# Patient Record
Sex: FEMALE | Race: WHITE | NOT HISPANIC OR LATINO | URBAN - METROPOLITAN AREA
[De-identification: names, ages, dates, MRNs, and addresses within clinical notes are randomized per-mention and may not be internally consistent; named-entity substitution may affect disease eponyms.]

---

## 2020-03-22 ENCOUNTER — EMERGENCY (EMERGENCY)
Facility: HOSPITAL | Age: 30
LOS: 1 days | End: 2020-03-22
Admitting: EMERGENCY MEDICINE
Payer: MEDICAID

## 2020-03-22 PROCEDURE — 99053 MED SERV 10PM-8AM 24 HR FAC: CPT

## 2020-03-22 PROCEDURE — 99284 EMERGENCY DEPT VISIT MOD MDM: CPT

## 2020-08-03 PROBLEM — Z00.00 ENCOUNTER FOR PREVENTIVE HEALTH EXAMINATION: Status: ACTIVE | Noted: 2020-08-03

## 2020-09-11 ENCOUNTER — APPOINTMENT (OUTPATIENT)
Dept: COLORECTAL SURGERY | Facility: CLINIC | Age: 30
End: 2020-09-11
Payer: MEDICAID

## 2020-09-11 VITALS
WEIGHT: 103 LBS | HEIGHT: 62 IN | HEART RATE: 75 BPM | DIASTOLIC BLOOD PRESSURE: 80 MMHG | TEMPERATURE: 97.8 F | SYSTOLIC BLOOD PRESSURE: 118 MMHG | BODY MASS INDEX: 18.95 KG/M2

## 2020-09-11 DIAGNOSIS — Z82.49 FAMILY HISTORY OF ISCHEMIC HEART DISEASE AND OTHER DISEASES OF THE CIRCULATORY SYSTEM: ICD-10-CM

## 2020-09-11 DIAGNOSIS — Z72.89 OTHER PROBLEMS RELATED TO LIFESTYLE: ICD-10-CM

## 2020-09-11 DIAGNOSIS — Z72.3 LACK OF PHYSICAL EXERCISE: ICD-10-CM

## 2020-09-11 PROCEDURE — 99202 OFFICE O/P NEW SF 15 MIN: CPT | Mod: 25

## 2020-09-11 PROCEDURE — 46600 DIAGNOSTIC ANOSCOPY SPX: CPT

## 2020-09-11 RX ORDER — SERTRALINE HYDROCHLORIDE 50 MG/1
50 TABLET, FILM COATED ORAL
Refills: 0 | Status: ACTIVE | COMMUNITY

## 2020-09-11 NOTE — ASSESSMENT
[FreeTextEntry1] : I reviewed the patient had her symptoms and findings may be attributable to a prolapsed hemorrhoid versus possible mucosal prolapse. I therefore recommended she take a picture of the findings after bowel movements a week and best evaluate. If there is residual questions we will proceed with an MRI. I have discussed with her that of hemorrhoidal protrusion is identified she would be an excellent for ligation. The risks, benefits alternatives and the treatment plan were outlined and reviewed.

## 2020-09-11 NOTE — PHYSICAL EXAM
[Normal] : was normal [None] : there was no rectal mass  [Excoriation] : no perianal excoriation [Skin Tags] : there were no residual hemorrhoidal skin tags seen [FreeTextEntry1] : A lighted anoscope was passed into the anal canal and the entire anal mucosal surface was inspected..  The findings revealed moderate internal hemorrhoids. No masses or lesions were identified.\par \par

## 2020-09-11 NOTE — HISTORY OF PRESENT ILLNESS
[FreeTextEntry1] : 29 yo F presents for evaluation of anal growth, referred by PCP Dr. Vlad Gomes\par \par Reports she noticed a small anal bump approximately 2 years ago. Denies pain, tenderness or BPR at this time. Area has increased in size over the last 4-6 months and now uncomfortable after BMs and recently noted itching and scant BRB noted on TP w/ some BMs\par + Prolapsing tissue that requires manual reduction with 80% of BMs\par Admits to occasional constipation and diarrhea which she attributes to coffee or taking Zoloft\par Occasional generalized abdominal pain and nausea. Denies vomiting. Has never been evaluated by GI\par Has never tried sitz baths or OTC hemorrhoidal medication\par \par BH: daily to every 2 days w/ straining\par Reports intake of dietary fiber and admits could improve water intake\par Denies use of stool softeners or fiber supplements\par \par Never had colonoscopy\par Denies [facility] colorectal CA\par Denies  ASA/NSAIDs in last 7 days\par

## 2020-11-09 ENCOUNTER — APPOINTMENT (OUTPATIENT)
Dept: COLORECTAL SURGERY | Facility: CLINIC | Age: 30
End: 2020-11-09

## 2021-02-12 ENCOUNTER — APPOINTMENT (OUTPATIENT)
Dept: COLORECTAL SURGERY | Facility: CLINIC | Age: 31
End: 2021-02-12

## 2021-08-02 ENCOUNTER — APPOINTMENT (OUTPATIENT)
Dept: COLORECTAL SURGERY | Facility: CLINIC | Age: 31
End: 2021-08-02
Payer: MEDICAID

## 2021-08-02 VITALS
WEIGHT: 104 LBS | HEART RATE: 77 BPM | SYSTOLIC BLOOD PRESSURE: 105 MMHG | DIASTOLIC BLOOD PRESSURE: 67 MMHG | TEMPERATURE: 98.5 F | BODY MASS INDEX: 19.14 KG/M2 | HEIGHT: 62 IN

## 2021-08-02 PROCEDURE — 99072 ADDL SUPL MATRL&STAF TM PHE: CPT

## 2021-08-02 PROCEDURE — 99213 OFFICE O/P EST LOW 20 MIN: CPT | Mod: 25

## 2021-08-02 PROCEDURE — 46221 LIGATION OF HEMORRHOID(S): CPT

## 2021-08-02 RX ORDER — DEXTROAMPHETAMINE SACCHARATE, AMPHETAMINE ASPARTATE, DEXTROAMPHETAMINE SULFATE AND AMPHETAMINE SULFATE 2.5; 2.5; 2.5; 2.5 MG/1; MG/1; MG/1; MG/1
10 TABLET ORAL
Qty: 30 | Refills: 0 | Status: ACTIVE | COMMUNITY
Start: 2021-07-30

## 2021-08-02 NOTE — HISTORY OF PRESENT ILLNESS
[FreeTextEntry1] : 32 yo F presents for f/u hemorrhoids\par \par Last seen 9/11/2020, external hemorrhoid and moderate internal hemorrhoids noted on exam\par Advised findings may be related to prolapsed hemorrhoid vs rectal prolapse\par Pt presents for RBL\par \par c/o prolapsing tissue w/ every BM, which often reduces on its own\par Occasional scant BRB noted on TP\par Denies pain, itching, burning\par \par BH: every other day, admits to some constipation associated w/ straining\par Has been increasing dietary fiber, admits could improve water intake (drinks 4 cups water)\par Denies fiber supplement or stool softener\par Denies ASA/NSAID use in the last 7 days

## 2021-08-02 NOTE — PHYSICAL EXAM
[Excoriation] : no perianal excoriation [Skin Tags] : there were no residual hemorrhoidal skin tags seen [Normal] : was normal [None] : there was no rectal mass  [FreeTextEntry1] : Medical assistant was present for the entire exam.\par \par Anoscopy was performed for evaluation of the patients rectal bleeding  history .\par The risks, benefits and alternatives were reviewed.\par \par A lighted anoscope was passed into the anal canal and the entire anal mucosal surface was inspected..  \par The findings revealed moderate internal hemorrhoids.\par No masses or lesions were identified.\par \par \par The risks and benefits of rubber band ligation were discussed with the patient including but not limited to bleeding, pain, infection, and the need for future procedures. The anoscope was placed and rubber band ligation was performed of the internal hemorrhoids - RPQ with good result. The patient tolerated the procedure well. Appropriate postprocedure instructions were given to the patient.\par

## 2022-05-06 ENCOUNTER — APPOINTMENT (OUTPATIENT)
Dept: COLORECTAL SURGERY | Facility: CLINIC | Age: 32
End: 2022-05-06
Payer: MEDICAID

## 2022-05-06 ENCOUNTER — NON-APPOINTMENT (OUTPATIENT)
Age: 32
End: 2022-05-06

## 2022-05-06 VITALS
TEMPERATURE: 97.9 F | BODY MASS INDEX: 18.4 KG/M2 | WEIGHT: 100 LBS | DIASTOLIC BLOOD PRESSURE: 62 MMHG | HEIGHT: 62 IN | SYSTOLIC BLOOD PRESSURE: 91 MMHG | HEART RATE: 76 BPM

## 2022-05-06 PROCEDURE — 46221 LIGATION OF HEMORRHOID(S): CPT

## 2022-05-06 PROCEDURE — 99214 OFFICE O/P EST MOD 30 MIN: CPT | Mod: 25

## 2022-05-06 NOTE — HISTORY OF PRESENT ILLNESS
[FreeTextEntry1] : 33 yo F presents for f/u hemorrhoids\par \par Last seen 8/2/21 c/o prolapsing tissue w/ every BM. External hemorrhoid, moderate internal emorrhoids noted, s/p RBL to RPQ\par \par Patient returns today with complaints of persistent hemorrhoidal protrusion after bowel movements.  Requires manual reduction.  Bowel movements generally well formed without much difficulty.

## 2022-05-06 NOTE — PHYSICAL EXAM
[Excoriation] : no perianal excoriation [Skin Tags] : there were no residual hemorrhoidal skin tags seen [Tight] : was tight [None] : there was no rectal mass  [FreeTextEntry1] : Medical assistant was present for the entire exam.\par \par Anoscopy was performed for evaluation of the patients rectal bleeding  history .\par The risks, benefits and alternatives were reviewed.\par \par A lighted anoscope was passed into the anal canal and the entire anal mucosal surface was inspected..  \par The findings revealed moderate internal hemorrhoids.\par No masses or lesions were identified.\par \par The risks and benefits of rubber band ligation were discussed with the patient including but not limited to bleeding, pain, infection, and the need for future procedures. The anoscope was placed and rubber band ligation was performed of the internal hemorrhoids - raq with good result. The patient tolerated the procedure well. Appropriate postprocedure instructions were given to the patient.\par

## 2022-05-06 NOTE — ASSESSMENT
[FreeTextEntry1] : I reviewed with the patient that her findings on examination are consistent with moderate internal hemorrhoids.  I have advised her to return for repeat assessment in 4 weeks if symptoms are persistent.  Recommend she obtain photodocumentation of the degree of prolapse and so that we can best determine and accurately assess her situation.  I have discussed with her that potentially we can place further rubber bands but do not feel that surgical treatment would be appropriate for her minimal internal hemorrhoids.  However, the possibility of an underlying rectal prolapse may be considered if photodocumentation reveals full-thickness rectal prolapse in addition to her internal hemorrhoidal disease.

## 2023-04-10 ENCOUNTER — APPOINTMENT (OUTPATIENT)
Dept: COLORECTAL SURGERY | Facility: CLINIC | Age: 33
End: 2023-04-10

## 2024-02-01 ENCOUNTER — APPOINTMENT (OUTPATIENT)
Dept: COLORECTAL SURGERY | Facility: CLINIC | Age: 34
End: 2024-02-01

## 2024-03-04 ENCOUNTER — NON-APPOINTMENT (OUTPATIENT)
Age: 34
End: 2024-03-04

## 2024-03-04 ENCOUNTER — APPOINTMENT (OUTPATIENT)
Dept: COLORECTAL SURGERY | Facility: CLINIC | Age: 34
End: 2024-03-04
Payer: MEDICAID

## 2024-03-04 VITALS
HEIGHT: 62 IN | BODY MASS INDEX: 19.49 KG/M2 | DIASTOLIC BLOOD PRESSURE: 63 MMHG | OXYGEN SATURATION: 98 % | HEART RATE: 69 BPM | WEIGHT: 105.9 LBS | SYSTOLIC BLOOD PRESSURE: 97 MMHG

## 2024-03-04 DIAGNOSIS — K64.8 OTHER HEMORRHOIDS: ICD-10-CM

## 2024-03-04 PROCEDURE — 46221 LIGATION OF HEMORRHOID(S): CPT

## 2024-03-04 PROCEDURE — 99212 OFFICE O/P EST SF 10 MIN: CPT | Mod: 25

## 2024-03-04 NOTE — ASSESSMENT
[FreeTextEntry1] : I had a detailed discussion with the patient regarding optimization of bowel movements with increasing daily fiber and water intake. Both synthetic and dietary fiber recommendations were reviewed. I had strongly counseled the patient regarding the need for increasing water to help improve  bowel function.  I discussed with the patient that improving  bowel function will alleviate  hemorrhoidal symptoms.  Advised return in 4-6 weeks if symptoms are persistent.

## 2024-03-04 NOTE — HISTORY OF PRESENT ILLNESS
[FreeTextEntry1] : 34 y/o F presents for follow up evaluation of hemorrhoids   Seen 8/2/21, c/o prolapsing tissue w/ every BM. External hemorrhoid, moderate internal hemorrhoids noted, s/p RBL to RPQ  Last seen in the office 5/6/22, pt returns c/o persistent hemorrhoidal protrusion after bowel movements. Requires manual reduction. On exam, external hemorrhoid, no residual hemorrhoidal skin tags. Sphincter tone was tight. Moderate internal hemorrhoids. S/p RBL of RAQ  Since last visit, pt has had intermittent prolapsing tissue that will reduce on its own.  Pt reports return of symptoms 3-4 weeks ago w/ very large ball of hemorrhoid tissue that prolapsed that eventually reduced after 2 days. Admits to pain w/ sitting and some BRB on TP  Pt reports varying stool pattern, typically has BM daily to every other day if she's home, but will have constipation if she travels and experiences no BM for 3-4 days.  Reports adequate fiber intake and has been eating more salads with more regular BMs. Admits to limited water intake and drinks 4 espressos daily. Denies taking stool softener or fiber supplement Denies ASA/NSAID use
